# Patient Record
Sex: FEMALE | Race: WHITE | NOT HISPANIC OR LATINO | URBAN - METROPOLITAN AREA
[De-identification: names, ages, dates, MRNs, and addresses within clinical notes are randomized per-mention and may not be internally consistent; named-entity substitution may affect disease eponyms.]

---

## 2018-12-03 ENCOUNTER — OUTPATIENT (OUTPATIENT)
Dept: EMERGENCY DEPT | Facility: HOSPITAL | Age: 23
LOS: 1 days | Discharge: HOME | End: 2018-12-03

## 2018-12-03 VITALS
HEART RATE: 101 BPM | SYSTOLIC BLOOD PRESSURE: 131 MMHG | TEMPERATURE: 97 F | OXYGEN SATURATION: 99 % | RESPIRATION RATE: 18 BRPM | DIASTOLIC BLOOD PRESSURE: 79 MMHG

## 2018-12-03 VITALS
RESPIRATION RATE: 18 BRPM | SYSTOLIC BLOOD PRESSURE: 123 MMHG | DIASTOLIC BLOOD PRESSURE: 74 MMHG | HEART RATE: 106 BPM | TEMPERATURE: 99 F
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
## 2018-12-03 DIAGNOSIS — R10.30 LOWER ABDOMINAL PAIN, UNSPECIFIED: ICD-10-CM

## 2018-12-03 DIAGNOSIS — O9A.213 INJURY, POISONING AND CERTAIN OTHER CONSEQUENCES OF EXTERNAL CAUSES COMPLICATING PREGNANCY, THIRD TRIMESTER: ICD-10-CM
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
## 2018-12-03 DIAGNOSIS — R07.9 CHEST PAIN, UNSPECIFIED: ICD-10-CM

## 2018-12-03 LAB
ALBUMIN SERPL ELPH-MCNC: 3.4 G/DL — LOW (ref 3.5–5.2)
ALP SERPL-CCNC: 101 U/L — SIGNIFICANT CHANGE UP (ref 30–115)
ALT FLD-CCNC: 12 U/L — SIGNIFICANT CHANGE UP (ref 0–41)
AMPHET UR-MCNC: NEGATIVE — SIGNIFICANT CHANGE UP
ANION GAP SERPL CALC-SCNC: 16 MMOL/L — HIGH (ref 7–14)
APPEARANCE UR: ABNORMAL
APTT BLD: 26.3 SEC — LOW (ref 27–39.2)
APTT BLD: 26.7 SEC — LOW (ref 27–39.2)
APTT BLD: 33.5 SEC — SIGNIFICANT CHANGE UP (ref 27–39.2)
AST SERPL-CCNC: 19 U/L — SIGNIFICANT CHANGE UP (ref 0–41)
BACTERIA # UR AUTO: ABNORMAL /HPF
BARBITURATES UR SCN-MCNC: NEGATIVE — SIGNIFICANT CHANGE UP
BASOPHILS # BLD AUTO: 0.04 K/UL — SIGNIFICANT CHANGE UP (ref 0–0.2)
BASOPHILS NFR BLD AUTO: 0.3 % — SIGNIFICANT CHANGE UP (ref 0–1)
BENZODIAZ UR-MCNC: NEGATIVE — SIGNIFICANT CHANGE UP
BILIRUB SERPL-MCNC: 0.2 MG/DL — SIGNIFICANT CHANGE UP (ref 0.2–1.2)
BILIRUB UR-MCNC: NEGATIVE — SIGNIFICANT CHANGE UP
BLD GP AB SCN SERPL QL: SIGNIFICANT CHANGE UP
BUN SERPL-MCNC: 9 MG/DL — LOW (ref 10–20)
CALCIUM SERPL-MCNC: 9.6 MG/DL — SIGNIFICANT CHANGE UP (ref 8.5–10.1)
CHLORIDE SERPL-SCNC: 96 MMOL/L — LOW (ref 98–110)
CO2 SERPL-SCNC: 21 MMOL/L — SIGNIFICANT CHANGE UP (ref 17–32)
COCAINE METAB.OTHER UR-MCNC: NEGATIVE — SIGNIFICANT CHANGE UP
COLOR SPEC: YELLOW — SIGNIFICANT CHANGE UP
CREAT SERPL-MCNC: 0.6 MG/DL — LOW (ref 0.7–1.5)
DIFF PNL FLD: ABNORMAL
EOSINOPHIL # BLD AUTO: 0.08 K/UL — SIGNIFICANT CHANGE UP (ref 0–0.7)
EOSINOPHIL NFR BLD AUTO: 0.6 % — SIGNIFICANT CHANGE UP (ref 0–8)
EPI CELLS # UR: ABNORMAL /HPF
ETHANOL SERPL-MCNC: <10 MG/DL — HIGH
FIBRINOGEN PPP-MCNC: 398 MG/DL — SIGNIFICANT CHANGE UP (ref 204.4–570.6)
FIBRINOGEN PPP-MCNC: 483 MG/DL — SIGNIFICANT CHANGE UP (ref 204.4–570.6)
GLUCOSE SERPL-MCNC: 131 MG/DL — HIGH (ref 70–99)
GLUCOSE UR QL: NEGATIVE MG/DL — SIGNIFICANT CHANGE UP
HCT VFR BLD CALC: 38.3 % — SIGNIFICANT CHANGE UP (ref 37–47)
HCT VFR BLD CALC: 38.8 % — SIGNIFICANT CHANGE UP (ref 37–47)
HCT VFR BLD CALC: 39.2 % — SIGNIFICANT CHANGE UP (ref 37–47)
HGB BLD-MCNC: 13.3 G/DL — SIGNIFICANT CHANGE UP (ref 12–16)
HGB BLD-MCNC: 13.3 G/DL — SIGNIFICANT CHANGE UP (ref 12–16)
HGB BLD-MCNC: 13.6 G/DL — SIGNIFICANT CHANGE UP (ref 12–16)
HIV 1 & 2 AB SERPL IA.RAPID: SIGNIFICANT CHANGE UP
IMM GRANULOCYTES NFR BLD AUTO: 4.8 % — HIGH (ref 0.1–0.3)
INR BLD: 1 RATIO — SIGNIFICANT CHANGE UP (ref 0.65–1.3)
INR BLD: 1.04 RATIO — SIGNIFICANT CHANGE UP (ref 0.65–1.3)
INR BLD: 1.08 RATIO — SIGNIFICANT CHANGE UP (ref 0.65–1.3)
KETONES UR-MCNC: NEGATIVE — SIGNIFICANT CHANGE UP
LACTATE SERPL-SCNC: 2.3 MMOL/L — HIGH (ref 0.5–2.2)
LEUKOCYTE ESTERASE UR-ACNC: ABNORMAL
LIDOCAIN IGE QN: 46 U/L — SIGNIFICANT CHANGE UP (ref 7–60)
LYMPHOCYTES # BLD AUTO: 17.8 % — LOW (ref 20.5–51.1)
LYMPHOCYTES # BLD AUTO: 2.29 K/UL — SIGNIFICANT CHANGE UP (ref 1.2–3.4)
MCHC RBC-ENTMCNC: 29.6 PG — SIGNIFICANT CHANGE UP (ref 27–31)
MCHC RBC-ENTMCNC: 30 PG — SIGNIFICANT CHANGE UP (ref 27–31)
MCHC RBC-ENTMCNC: 30.2 PG — SIGNIFICANT CHANGE UP (ref 27–31)
MCHC RBC-ENTMCNC: 34.3 G/DL — SIGNIFICANT CHANGE UP (ref 32–37)
MCHC RBC-ENTMCNC: 34.7 G/DL — SIGNIFICANT CHANGE UP (ref 32–37)
MCHC RBC-ENTMCNC: 34.7 G/DL — SIGNIFICANT CHANGE UP (ref 32–37)
MCV RBC AUTO: 86.3 FL — SIGNIFICANT CHANGE UP (ref 81–99)
MCV RBC AUTO: 86.4 FL — SIGNIFICANT CHANGE UP (ref 81–99)
MCV RBC AUTO: 86.9 FL — SIGNIFICANT CHANGE UP (ref 81–99)
METHADONE UR-MCNC: NEGATIVE — SIGNIFICANT CHANGE UP
MONOCYTES # BLD AUTO: 0.84 K/UL — HIGH (ref 0.1–0.6)
MONOCYTES NFR BLD AUTO: 6.5 % — SIGNIFICANT CHANGE UP (ref 1.7–9.3)
NEUTROPHILS # BLD AUTO: 8.99 K/UL — HIGH (ref 1.4–6.5)
NEUTROPHILS NFR BLD AUTO: 70 % — SIGNIFICANT CHANGE UP (ref 42.2–75.2)
NITRITE UR-MCNC: NEGATIVE — SIGNIFICANT CHANGE UP
NRBC # BLD: 0 /100 WBCS — SIGNIFICANT CHANGE UP (ref 0–0)
NRBC # BLD: 0 /100 WBCS — SIGNIFICANT CHANGE UP (ref 0–0)
NRBC # BLD: SIGNIFICANT CHANGE UP (ref 0–0)
OPIATES UR-MCNC: NEGATIVE — SIGNIFICANT CHANGE UP
PCP SPEC-MCNC: SIGNIFICANT CHANGE UP
PH UR: 6.5 — SIGNIFICANT CHANGE UP (ref 5–8)
PLATELET # BLD AUTO: 149 K/UL — SIGNIFICANT CHANGE UP (ref 130–400)
PLATELET # BLD AUTO: 154 K/UL — SIGNIFICANT CHANGE UP (ref 130–400)
PLATELET # BLD AUTO: 155 K/UL — SIGNIFICANT CHANGE UP (ref 130–400)
POTASSIUM SERPL-MCNC: 3.8 MMOL/L — SIGNIFICANT CHANGE UP (ref 3.5–5)
POTASSIUM SERPL-SCNC: 3.8 MMOL/L — SIGNIFICANT CHANGE UP (ref 3.5–5)
PRENATAL SYPHILIS TEST: SIGNIFICANT CHANGE UP
PROPOXYPHENE QUALITATIVE URINE RESULT: NEGATIVE — SIGNIFICANT CHANGE UP
PROT SERPL-MCNC: 5.9 G/DL — LOW (ref 6–8)
PROT UR-MCNC: NEGATIVE MG/DL — SIGNIFICANT CHANGE UP
PROTHROM AB SERPL-ACNC: 11.5 SEC — SIGNIFICANT CHANGE UP (ref 9.95–12.87)
PROTHROM AB SERPL-ACNC: 12 SEC — SIGNIFICANT CHANGE UP (ref 9.95–12.87)
PROTHROM AB SERPL-ACNC: 12.4 SEC — SIGNIFICANT CHANGE UP (ref 9.95–12.87)
RBC # BLD: 4.44 M/UL — SIGNIFICANT CHANGE UP (ref 4.2–5.4)
RBC # BLD: 4.49 M/UL — SIGNIFICANT CHANGE UP (ref 4.2–5.4)
RBC # BLD: 4.51 M/UL — SIGNIFICANT CHANGE UP (ref 4.2–5.4)
RBC # FLD: 12.6 % — SIGNIFICANT CHANGE UP (ref 11.5–14.5)
RBC # FLD: 12.7 % — SIGNIFICANT CHANGE UP (ref 11.5–14.5)
RBC # FLD: 12.7 % — SIGNIFICANT CHANGE UP (ref 11.5–14.5)
SODIUM SERPL-SCNC: 133 MMOL/L — LOW (ref 135–146)
SP GR SPEC: 1.01 — SIGNIFICANT CHANGE UP (ref 1.01–1.03)
TYPE + AB SCN PNL BLD: SIGNIFICANT CHANGE UP
UROBILINOGEN FLD QL: 0.2 MG/DL — SIGNIFICANT CHANGE UP (ref 0.2–0.2)
WBC # BLD: 12.86 K/UL — HIGH (ref 4.8–10.8)
WBC # BLD: 15.66 K/UL — HIGH (ref 4.8–10.8)
WBC # BLD: 18.95 K/UL — HIGH (ref 4.8–10.8)
WBC # FLD AUTO: 12.86 K/UL — HIGH (ref 4.8–10.8)
WBC # FLD AUTO: 15.66 K/UL — HIGH (ref 4.8–10.8)
WBC # FLD AUTO: 18.95 K/UL — HIGH (ref 4.8–10.8)
WBC UR QL: ABNORMAL /HPF

## 2018-12-03 RX ORDER — SODIUM CHLORIDE 9 MG/ML
1000 INJECTION INTRAMUSCULAR; INTRAVENOUS; SUBCUTANEOUS ONCE
Qty: 0 | Refills: 0 | Status: DISCONTINUED | OUTPATIENT
Start: 2018-12-03 | End: 2018-12-18
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
## 2018-12-03 NOTE — OB PROVIDER TRIAGE NOTE - NSHPPHYSICALEXAM_GEN_ALL_CORE
Physical exam:  Vital Signs Last 24 Hrs  T(F): 97.6 (03 Dec 2018 01:37), Max: 97.6 (03 Dec 2018 01:37)  HR: 97 (03 Dec 2018 03:56) (97 - 121)  BP: 89/59 (03 Dec 2018 03:56) (89/59 - 131/79)  RR: 18 (03 Dec 2018 01:37) (18 - 18)  SpO2: 99% (03 Dec 2018 00:12) (99% - 99%)  Udip not collected  Gen: NAD  resp: CTA b/l, point tenderness on palpation para xyphoid area.  Abdomen: Soft, nontender, gravid. EFW 3000g  Ext: No calf tenderness  VE: 3/60/-2 intact  EFM: 120/mod/+accels  toco: q 3-5min  Sono: vertex, ant placenta, BPP 8/8, MVP 1.63cm Physical exam:  Vital Signs Last 24 Hrs  T(F): 97.6 (03 Dec 2018 01:37), Max: 97.6 (03 Dec 2018 01:37)  HR: 97 (03 Dec 2018 03:56) (97 - 121)  BP: 89/59 (03 Dec 2018 03:56) (89/59 - 131/79)  RR: 18 (03 Dec 2018 01:37) (18 - 18)  SpO2: 99% (03 Dec 2018 00:12) (99% - 99%)  Udip not collected  Gen: NAD  resp: CTA b/l, point tenderness on palpation para sternal  Abdomen: Soft, nontender, gravid. EFW 3000g  Ext: No calf tenderness  VE: 3/60/-2 intact  EFM: 120/mod/+accels  toco: q 3-5min  Sono: vertex, ant placenta, BPP 8/8, MVP 1.63cm. EFW 3041g(19.2%) Physical exam:  Vital Signs Last 24 Hrs  T(F): 97.6 (03 Dec 2018 01:37), Max: 97.6 (03 Dec 2018 01:37)  HR: 97 (03 Dec 2018 03:56) (97 - 121)  BP: 89/59 (03 Dec 2018 03:56) (89/59 - 131/79)  RR: 18 (03 Dec 2018 01:37) (18 - 18)  SpO2: 99% (03 Dec 2018 00:12) (99% - 99%)  Udip not collected  Gen: NAD  resp: CTA b/l, point tenderness on palpation para sternal  Abdomen: Echymoses notedo n lower right abdomen. Soft, nontender, gravid. EFW 3000g  Ext: No calf tenderness. Echymosis noted on upper right thigh  VE: 3/60/-2 intact  EFM: 120/mod/+accels  toco: q 3-5min  Sono: vertex, ant placenta, BPP 8/8, MVP 1.63cm. EFW 3041g(19.2%) Physical exam:  Vital Signs Last 24 Hrs  T(F): 97.6 (03 Dec 2018 01:37), Max: 97.6 (03 Dec 2018 01:37)  HR: 97 (03 Dec 2018 03:56) (97 - 121)  BP: 89/59 (03 Dec 2018 03:56) (89/59 - 131/79)  RR: 18 (03 Dec 2018 01:37) (18 - 18)  SpO2: 99% (03 Dec 2018 00:12) (99% - 99%)  Udip not collected  Gen: NAD  resp: CTA b/l, point tenderness on palpation para sternal. No ecchymosis noted  Abdomen: Ecchymoses noted on lower right abdomen. Soft, nontender, gravid. EFW 3000g. mild palpable contractions.  Ext: No calf tenderness. Ecchymosis noted on upper right thigh  VE: 3/60/-2 vertex, intact  EFM: 120/mod/+accels  toco: q 3-5min  Sono: vertex, ant placenta, BPP 8/8, MVP 1.63cm. EFW 3041g(19.2%) Physical exam:  Vital Signs Last 24 Hrs  T(F): 97.6 (03 Dec 2018 01:37), Max: 97.6 (03 Dec 2018 01:37)  HR: 97 (03 Dec 2018 03:56) (97 - 121)  BP: 89/59 (03 Dec 2018 03:56) (89/59 - 131/79)  RR: 18 (03 Dec 2018 01:37) (18 - 18)  SpO2: 99% (03 Dec 2018 00:12) (99% - 99%)  Udip not collected  Gen: NAD  resp: CTA b/l, point tenderness on palpation para sternal. No ecchymosis noted  Abdomen: Ecchymoses noted on lower right abdomen. Soft, nontender, gravid. EFW 3000g. mild palpable contractions.  Ext: No calf tenderness. Ecchymosis noted on upper right thigh  VE: 3/60/-2 vertex, intact  EFM: 120/mod/+accels  toco: q 3-5min  Sono: vertex, ant placenta, BPP 8/8, MVP 1.63cm. EFW 3041g(19.2%)      ROS: Negative for constitutional, skin, cardio, resp, gi, gu, ext, neurologic, psychiatric, lymphatic complaints

## 2018-12-03 NOTE — ED ADULT NURSE NOTE - NSIMPLEMENTINTERV_GEN_ALL_ED
Implemented All Universal Safety Interventions:  [place] to call system. Call bell, personal items and telephone within reach. Instruct patient to call for assistance. Room bathroom lighting operational. Non-slip footwear when patient is off stretcher. Physically safe environment: no spills, clutter or unnecessary equipment. Stretcher in lowest position, wheels locked, appropriate side rails in place.
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
## 2018-12-03 NOTE — PROGRESS NOTE ADULT - SUBJECTIVE AND OBJECTIVE BOX
PGY1 note    patient seen and evaluated at bedside. Feels well. Reports contractions are less frequent, now around 10min apart. Denies vaginal bleeding, LOF. Reports good fetal movement. She reports her chest pain present on admission have improved. She has made an appointment with her PMD tomorrow at the office.     Vital Signs Last 24 Hrs  T(F): 96.8 (03 Dec 2018 11:00), Max: 97.6 (03 Dec 2018 01:37)  HR: 100 (03 Dec 2018 21:07) (97 - 121)  BP: 135/84 (03 Dec 2018 21:07) (89/59 - 135/84)  RR: 18 (03 Dec 2018 21:07) (18 - 18)  SpO2: 99% (03 Dec 2018 00:12) (99% - 99%)    Gen: NAD  resp: CTA b/l. Point tenderness improved.  Abdomen: Soft, nontender, gravid. Ecchymosis on right lower abdomen.   Ext: ecchymosis on right upper thigh.  VE: Patient refused SVE  EFM: 140/mod/+accel  toco: q 9min      Labs:                        13.3   15.66 )-----------( 155      ( 03 Dec 2018 13:45 )             38.8                         13.3   18.95 )-----------( 149      ( 03 Dec 2018 05:00 )             38.3     Antibody Screen: NEG (12-03-18 @ 00:30)  Type + Screen: A POS (12-03-18 @ 00:30)    Prenatal Syphilis Test: Nonreact (12-03-18 @ 05:00) PGY1 note    patient seen and evaluated at bedside. Feels well. Reports contractions are less frequent, now around 10min apart. Denies vaginal bleeding, LOF. Reports good fetal movement. She reports her chest pain present on admission have improved. She has made an appointment with her PMD tomorrow at the office.     Vital Signs Last 24 Hrs  T(F): 96.8 (03 Dec 2018 11:00), Max: 97.6 (03 Dec 2018 01:37)  HR: 100 (03 Dec 2018 21:07) (97 - 121)  BP: 135/84 (03 Dec 2018 21:07) (89/59 - 135/84)  RR: 18 (03 Dec 2018 21:07) (18 - 18)  SpO2: 99% (03 Dec 2018 00:12) (99% - 99%)    Gen: NAD  resp: CTA b/l. Point tenderness improved.  Abdomen: Soft, nontender, gravid. Ecchymosis on right lower abdomen.   Ext: ecchymosis on right upper thigh.  VE: 3-4/80/-2 Intact  EFM: 140/mod/+accel  toco: q 9min      Labs:                        13.3   15.66 )-----------( 155      ( 03 Dec 2018 13:45 )             38.8                         13.3   18.95 )-----------( 149      ( 03 Dec 2018 05:00 )             38.3     Antibody Screen: NEG (12-03-18 @ 00:30)  Type + Screen: A POS (12-03-18 @ 00:30)    Prenatal Syphilis Test: Nonreact (12-03-18 @ 05:00)    12/3/18 0500 PTT 26.3, PT 12, INR 1.04, fibrinogen 483    12/3 1345 PTT33.5 PT11.5 INR 1.00 fibrinogen 398 PGY1 note    patient seen and evaluated at bedside. Feels well. Reports contractions are less frequent, now around 10min apart. Denies vaginal bleeding, LOF. Reports good fetal movement. She reports her chest pain present on admission have improved. She has made an appointment with her PMD tomorrow at the office.     Vital Signs Last 24 Hrs  T(F): 96.8 (03 Dec 2018 11:00), Max: 97.6 (03 Dec 2018 01:37)  HR: 100 (03 Dec 2018 21:07) (97 - 121)  BP: 135/84 (03 Dec 2018 21:07) (89/59 - 135/84)  RR: 18 (03 Dec 2018 21:07) (18 - 18)  SpO2: 99% (03 Dec 2018 00:12) (99% - 99%)    Gen: NAD  resp: CTA b/l. Point tenderness improved.  Abdomen: Soft, nontender, gravid. Ecchymosis on right lower abdomen- unchanged  Ext: ecchymosis on right upper thigh- unchanged  VE: 3-4/80/-2 vertex, Intact  EFM: 140/mod/+accel  toco: q 9min      Labs:                        13.3   15.66 )-----------( 155      ( 03 Dec 2018 13:45 )             38.8                         13.3   18.95 )-----------( 149      ( 03 Dec 2018 05:00 )             38.3     Antibody Screen: NEG (12-03-18 @ 00:30)  Type + Screen: A POS (12-03-18 @ 00:30)    Prenatal Syphilis Test: Nonreact (12-03-18 @ 05:00)    12/3/18 0500 PTT 26.3, PT 12, INR 1.04, fibrinogen 483    12/3 1345 PTT33.5 PT11.5 INR 1.00 fibrinogen 398
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
## 2018-12-03 NOTE — ED PROVIDER NOTE - PHYSICAL EXAMINATION
General: AOx4, GCS15, Non toxic appearing, gravid, NAD.   Head: normocephalic, atraumatic, no battles sign or raccoon eyes.   Skin:  warm and dry, no acute rash, abrasions, lacerations or hematomas.   EENT: PERRL/EOM, conjunctiva and sclera clear. MM moist, no nasal discharge.  Dentition intact.   Neck: supple, nt, no midline ttp or stepoffs.   Back: nttp no midline ttp or stepoffs. No flank bruising or tenderness.   Heart: RRR s1s2 nl, no rub/murmur.   Lungs: BS equal, CTAB.   Abdomen: soft mildly ttp, no r/g, no seatbelt sign.   Chest: no bruising, sub cutaneous emphysema, or crepitus, nttp.   Extremities: moves all normally, sensation wnl, no cyanosis.   Neuro: CN 2-12 grossly intact. +5/5 strength and sensation wnl in all extremities.

## 2018-12-03 NOTE — OB PROVIDER TRIAGE NOTE - NS_OBGYNHISTORY_OBGYN_ALL_OB_FT
, full term, no complications, 7.1lbs  Gynhx: Denies fibroids, ovarian cyts, abnormal pap smears or STI's ,  full term, no complications, 7.1lbs  Gynhx: Denies fibroids, ovarian cyts, abnormal pap smears or STI's
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
## 2018-12-03 NOTE — PROGRESS NOTE ADULT - SUBJECTIVE AND OBJECTIVE BOX
Bedside sonogram performed.   There is a single living intrauterine pregnancy.  Vertex is presenting.  Anterior placenta.  Maximal vertical pocket of amniotic fluid is 24 mm.  Biophysical profile is 10/10.
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
## 2018-12-03 NOTE — ED PROVIDER NOTE - NS ED ROS FT
Constitutional: NAD  Eyes: No visual changes, eye pain or discharge.  ENMT: No hearing changes, pain, discharge or infections. No neck pain or stiffness.  Cardiac: +chest pain. No SOB or edema. No chest pain with exertion.  Respiratory: No cough or respiratory distress.   GI: No nausea, vomiting, diarrhea. +abdominal pain.  : No dysuria, frequency or burning.  MS: No myalgia, muscle weakness, joint pain or back pain.  Neuro: No headache or weakness. No LOC.  Skin: No skin rash.  Heme: No bruising
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
## 2018-12-04 LAB — HBV SURFACE AG SERPL QL IA: SIGNIFICANT CHANGE UP

## 2018-12-05 LAB
CULTURE RESULTS: SIGNIFICANT CHANGE UP
RUBV IGG SER-ACNC: 1.3 INDEX — SIGNIFICANT CHANGE UP
RUBV IGG SER-IMP: POSITIVE — SIGNIFICANT CHANGE UP
SPECIMEN SOURCE: SIGNIFICANT CHANGE UP

## 2020-10-18 NOTE — ED PROVIDER NOTE - NS ED ATTENDING STATEMENT MOD
"48 hour assessment:    Pt woke frustrated and threatening overnight staff.   Pt requested medications. Pt left quiet space and area was searched. One of pts medications from previous night was found in the room. (melatonin) Writer questioned pt and he stated he took the medication.   Pt was administered morning medications by 2 RN's and thorough mouth check was done by each RN. Medication was not in pts mouth. Pt went into bathroom to use toilet and brush teeth. Writer checked on pt an hour later and pt asked if writer would take his BP. Writer asked if he was feeling weird and pt shook head no. Writer took VS and BP was elevated. Pt was acting like he had done something so writer asked pt if he had taken something and pt smiled and said no. Pt blew his nose and mucous had blood in it. Pt stated \"I shouldn't have snorted that medication. It wasn't a med that should have been snorted.\" Writer explained none of the medications I administer should be snorted and pt smiled and said \"some of them are.\" Writer asked pt which med and pt stated \"the tan one\" which was the zoloft. Writer asked how he had done it as I had done a mouth check. Pt again smile and stated \"he would not tell.\" Pt was moved out of room with a bathroom and all medications changed to liquid. On-call provider updated. Pt was understanding about move and was compliant. Pt allowed staff to grab items because writer did not want pt back in his room in case pt had more medications crushed and hidden. Pt spent the remainder of the day in his room watching football.    " I have personally seen and examined this patient.  I have fully participated in the care of this patient. I have reviewed all pertinent clinical information, including history, physical exam, plan and the Resident’s note and agree except as noted.